# Patient Record
Sex: FEMALE | URBAN - METROPOLITAN AREA
[De-identification: names, ages, dates, MRNs, and addresses within clinical notes are randomized per-mention and may not be internally consistent; named-entity substitution may affect disease eponyms.]

---

## 2024-03-26 ENCOUNTER — APPOINTMENT (RX ONLY)
Dept: URBAN - METROPOLITAN AREA CLINIC 52 | Facility: CLINIC | Age: 44
Setting detail: DERMATOLOGY
End: 2024-03-26

## 2024-03-26 DIAGNOSIS — L82.0 INFLAMED SEBORRHEIC KERATOSIS: ICD-10-CM

## 2024-03-26 DIAGNOSIS — L81.4 OTHER MELANIN HYPERPIGMENTATION: ICD-10-CM | Status: WORSENING

## 2024-03-26 PROCEDURE — ? COUNSELING

## 2024-03-26 PROCEDURE — ? PRESCRIPTION

## 2024-03-26 PROCEDURE — 99204 OFFICE O/P NEW MOD 45 MIN: CPT

## 2024-03-26 PROCEDURE — ? DEFER

## 2024-03-26 RX ORDER — TRETIONIN 0.25 MG/G
CREAM TOPICAL
Qty: 45 | Refills: 2 | Status: CANCELLED | COMMUNITY
Start: 2024-03-26

## 2024-03-26 RX ORDER — TRETIONIN 0.25 MG/G
CREAM TOPICAL
Qty: 45 | Refills: 2 | Status: ERX

## 2024-03-26 RX ADMIN — TRETIONIN: 0.25 CREAM TOPICAL at 00:00

## 2024-03-26 ASSESSMENT — LOCATION SIMPLE DESCRIPTION DERM
LOCATION SIMPLE: LEFT CHEEK
LOCATION SIMPLE: LEFT CHEEK
LOCATION SIMPLE: RIGHT CHEEK

## 2024-03-26 ASSESSMENT — LOCATION DETAILED DESCRIPTION DERM
LOCATION DETAILED: RIGHT INFERIOR CENTRAL MALAR CHEEK
LOCATION DETAILED: LEFT CENTRAL MALAR CHEEK
LOCATION DETAILED: LEFT INFERIOR CENTRAL MALAR CHEEK

## 2024-03-26 ASSESSMENT — LOCATION ZONE DERM
LOCATION ZONE: FACE
LOCATION ZONE: FACE

## 2024-03-26 NOTE — PROCEDURE: DEFER
Size Of Lesion In Cm (Optional): 0
Procedure To Be Performed At Next Visit: Cryotherapy
Introduction Text (Please End With A Colon): Defer the following: [id number] [id number] [id number]
Detail Level: Detailed

## 2024-03-26 NOTE — PROCEDURE: MIPS QUALITY
Quality 431: Preventive Care And Screening: Unhealthy Alcohol Use - Screening: Patient identified as an unhealthy alcohol user when screened for unhealthy alcohol use using a systematic screening method and received brief counseling
Detail Level: Detailed
Quality 226: Preventive Care And Screening: Tobacco Use: Screening And Cessation Intervention: Tobacco Screening not Performed

## 2024-04-01 RX ORDER — TRETIONIN 0.25 MG/G
CREAM TOPICAL
Qty: 45 | Refills: 2 | Status: ERX

## 2024-04-23 ENCOUNTER — APPOINTMENT (RX ONLY)
Dept: URBAN - METROPOLITAN AREA CLINIC 52 | Facility: CLINIC | Age: 44
Setting detail: DERMATOLOGY
End: 2024-04-23

## 2024-04-23 DIAGNOSIS — L81.4 OTHER MELANIN HYPERPIGMENTATION: ICD-10-CM

## 2024-04-23 PROCEDURE — ? COUNSELING

## 2024-04-23 PROCEDURE — ? ADDITIONAL NOTES

## 2024-04-23 PROCEDURE — 99213 OFFICE O/P EST LOW 20 MIN: CPT

## 2024-04-23 PROCEDURE — ? DEFER

## 2024-04-23 PROCEDURE — ? PHOTO-DOCUMENTATION

## 2024-04-23 PROCEDURE — ? PRESCRIPTION

## 2024-04-23 RX ORDER — TRETIONIN 0.25 MG/G
CREAM TOPICAL
Qty: 45 | Refills: 3 | Status: ERX

## 2024-04-23 ASSESSMENT — LOCATION SIMPLE DESCRIPTION DERM
LOCATION SIMPLE: RIGHT CHEEK
LOCATION SIMPLE: LEFT CHEEK

## 2024-04-23 ASSESSMENT — LOCATION ZONE DERM: LOCATION ZONE: FACE

## 2024-04-23 ASSESSMENT — LOCATION DETAILED DESCRIPTION DERM
LOCATION DETAILED: LEFT INFERIOR CENTRAL MALAR CHEEK
LOCATION DETAILED: RIGHT INFERIOR CENTRAL MALAR CHEEK

## 2024-08-26 ENCOUNTER — APPOINTMENT (RX ONLY)
Dept: URBAN - METROPOLITAN AREA CLINIC 52 | Facility: CLINIC | Age: 44
Setting detail: DERMATOLOGY
End: 2024-08-26

## 2024-08-26 DIAGNOSIS — L82.0 INFLAMED SEBORRHEIC KERATOSIS: ICD-10-CM

## 2024-08-26 DIAGNOSIS — L81.4 OTHER MELANIN HYPERPIGMENTATION: ICD-10-CM

## 2024-08-26 PROCEDURE — 99213 OFFICE O/P EST LOW 20 MIN: CPT

## 2024-08-26 PROCEDURE — ? ADDITIONAL NOTES

## 2024-08-26 PROCEDURE — ? COUNSELING

## 2024-08-26 PROCEDURE — ? DEFER

## 2024-10-28 ENCOUNTER — APPOINTMENT (RX ONLY)
Dept: URBAN - METROPOLITAN AREA CLINIC 52 | Facility: CLINIC | Age: 44
Setting detail: DERMATOLOGY
End: 2024-10-28

## 2024-10-28 ENCOUNTER — RX ONLY (OUTPATIENT)
Age: 44
Setting detail: RX ONLY
End: 2024-10-28

## 2024-10-28 DIAGNOSIS — Z41.9 ENCOUNTER FOR PROCEDURE FOR PURPOSES OTHER THAN REMEDYING HEALTH STATE, UNSPECIFIED: ICD-10-CM

## 2024-10-28 DIAGNOSIS — L81.4 OTHER MELANIN HYPERPIGMENTATION: ICD-10-CM

## 2024-10-28 DIAGNOSIS — L71.0 PERIORAL DERMATITIS: ICD-10-CM

## 2024-10-28 PROCEDURE — ? COUNSELING

## 2024-10-28 PROCEDURE — ? PRESCRIPTION

## 2024-10-28 PROCEDURE — ? COSMETIC CONSULTATION: LASER RESURFACING

## 2024-10-28 PROCEDURE — ? DEFER

## 2024-10-28 PROCEDURE — 99213 OFFICE O/P EST LOW 20 MIN: CPT

## 2024-10-28 RX ORDER — METRONIDAZOLE 10 MG/G
GEL TOPICAL QHS
Qty: 60 | Refills: 3 | Status: ERX | COMMUNITY
Start: 2024-10-28

## 2024-10-28 RX ORDER — TRETIONIN 0.25 MG/G
CREAM TOPICAL
Qty: 45 | Refills: 3 | Status: ERX

## 2024-10-28 RX ADMIN — METRONIDAZOLE: 10 GEL TOPICAL at 00:00
